# Patient Record
Sex: FEMALE | NOT HISPANIC OR LATINO | Employment: UNEMPLOYED | URBAN - NONMETROPOLITAN AREA
[De-identification: names, ages, dates, MRNs, and addresses within clinical notes are randomized per-mention and may not be internally consistent; named-entity substitution may affect disease eponyms.]

---

## 2018-11-02 ENCOUNTER — OFFICE VISIT CONVERTED (OUTPATIENT)
Dept: FAMILY MEDICINE CLINIC | Age: 78
End: 2018-11-02
Attending: FAMILY MEDICINE

## 2019-02-01 ENCOUNTER — OFFICE VISIT CONVERTED (OUTPATIENT)
Dept: FAMILY MEDICINE CLINIC | Age: 79
End: 2019-02-01
Attending: FAMILY MEDICINE

## 2019-02-01 ENCOUNTER — HOSPITAL ENCOUNTER (OUTPATIENT)
Dept: OTHER | Facility: HOSPITAL | Age: 79
Discharge: HOME | End: 2019-02-01
Attending: FAMILY MEDICINE

## 2019-02-01 LAB
ALBUMIN SERPL-MCNC: 3.4 G/DL (ref 3.5–5)
ALBUMIN/GLOB SERPL: 0.9 {RATIO} (ref 1.4–2.6)
ALP SERPL-CCNC: 83 U/L (ref 43–160)
ALT SERPL-CCNC: 10 U/L (ref 10–40)
ANION GAP SERPL CALC-SCNC: 19 MMOL/L (ref 8–19)
AST SERPL-CCNC: 13 U/L (ref 15–50)
BASOPHILS # BLD AUTO: 0.05 10*3/UL (ref 0–0.2)
BASOPHILS NFR BLD AUTO: 0.5 % (ref 0–3)
BILIRUB SERPL-MCNC: 0.48 MG/DL (ref 0.2–1.3)
BUN SERPL-MCNC: 30 MG/DL (ref 5–25)
BUN/CREAT SERPL: 20 {RATIO} (ref 6–20)
CALCIUM SERPL-MCNC: 9.7 MG/DL (ref 8.7–10.4)
CHLORIDE SERPL-SCNC: 100 MMOL/L (ref 99–111)
CONV CO2: 22 MMOL/L (ref 22–32)
CONV TOTAL PROTEIN: 7.3 G/DL (ref 6.3–8.2)
CREAT UR-MCNC: 1.53 MG/DL (ref 0.5–0.9)
EOSINOPHIL # BLD AUTO: 0.18 10*3/UL (ref 0–0.7)
EOSINOPHIL # BLD AUTO: 1.66 % (ref 0–7)
ERYTHROCYTE [DISTWIDTH] IN BLOOD BY AUTOMATED COUNT: 14.7 % (ref 11.5–14.5)
EST. AVERAGE GLUCOSE BLD GHB EST-MCNC: 151 MG/DL
GFR SERPLBLD BASED ON 1.73 SQ M-ARVRAT: 32 ML/MIN/{1.73_M2}
GLOBULIN UR ELPH-MCNC: 3.9 G/DL (ref 2–3.5)
GLUCOSE SERPL-MCNC: 163 MG/DL (ref 65–99)
HBA1C MFR BLD: 11.2 G/DL (ref 12–16)
HBA1C MFR BLD: 6.9 % (ref 3.5–5.7)
HCT VFR BLD AUTO: 35.1 % (ref 37–47)
LYMPHOCYTES # BLD AUTO: 2.61 10*3/UL (ref 1–5)
MCH RBC QN AUTO: 27.8 PG (ref 27–31)
MCHC RBC AUTO-ENTMCNC: 32 G/DL (ref 33–37)
MCV RBC AUTO: 87 FL (ref 81–99)
MONOCYTES # BLD AUTO: 0.95 10*3/UL (ref 0.2–1.2)
MONOCYTES NFR BLD AUTO: 8.91 % (ref 3–10)
NEUTROPHILS # BLD AUTO: 6.83 10*3/UL (ref 2–8)
NEUTROPHILS NFR BLD AUTO: 64.3 % (ref 30–85)
NRBC BLD AUTO-RTO: 0 % (ref 0–0.01)
OSMOLALITY SERPL CALC.SUM OF ELEC: 292 MOSM/KG (ref 273–304)
PLATELET # BLD AUTO: 377 10*3/UL (ref 130–400)
PMV BLD AUTO: 7.8 FL (ref 7.4–10.4)
POTASSIUM SERPL-SCNC: 4.7 MMOL/L (ref 3.5–5.3)
RBC # BLD AUTO: 4.03 10*6/UL (ref 4.2–5.4)
SODIUM SERPL-SCNC: 136 MMOL/L (ref 135–147)
VARIANT LYMPHS NFR BLD MANUAL: 24.6 % (ref 20–45)
WBC # BLD AUTO: 10.6 10*3/UL (ref 4.8–10.8)

## 2019-04-02 ENCOUNTER — HOSPITAL ENCOUNTER (OUTPATIENT)
Dept: OTHER | Facility: HOSPITAL | Age: 79
Discharge: HOME | End: 2019-04-02
Attending: FAMILY MEDICINE

## 2019-04-02 ENCOUNTER — OFFICE VISIT CONVERTED (OUTPATIENT)
Dept: FAMILY MEDICINE CLINIC | Age: 79
End: 2019-04-02
Attending: FAMILY MEDICINE

## 2019-04-02 LAB
APPEARANCE UR: CLEAR
BACTERIA UR CULT: NORMAL
BACTERIA UR QL AUTO: ABNORMAL
BILIRUB UR QL: NEGATIVE
CASTS URNS QL MICRO: ABNORMAL /[LPF]
COLOR UR: YELLOW
CONV LEUKOCYTE ESTERASE: NEGATIVE
CONV UROBILINOGEN IN URINE BY AUTOMATED TEST STRIP: 0.2 {EHRLICHU}/DL (ref 0.1–1)
EPI CELLS #/AREA URNS HPF: ABNORMAL /[HPF]
GLUCOSE 24H UR-MCNC: 100 MG/DL
HGB UR QL STRIP: ABNORMAL
KETONES UR QL STRIP: NEGATIVE MG/DL
MUCOUS THREADS URNS QL MICRO: ABNORMAL
NITRITE UR-MCNC: NEGATIVE MG/ML
PH UR STRIP.AUTO: 5 [PH] (ref 5–8)
PROT UR-MCNC: >=300 MG/DL
RBC # BLD AUTO: ABNORMAL /[HPF]
SP GR UR STRIP: >=1.03 (ref 1–1.03)
SPECIMEN SOURCE: ABNORMAL
UNIDENT CRYS URNS QL MICRO: ABNORMAL /[HPF]
WBC #/AREA URNS HPF: ABNORMAL /[HPF]

## 2019-04-04 LAB — BACTERIA UR CULT: NORMAL

## 2019-05-06 ENCOUNTER — OFFICE VISIT CONVERTED (OUTPATIENT)
Dept: FAMILY MEDICINE CLINIC | Age: 79
End: 2019-05-06
Attending: NURSE PRACTITIONER

## 2019-06-05 ENCOUNTER — HOSPITAL ENCOUNTER (OUTPATIENT)
Dept: OTHER | Facility: HOSPITAL | Age: 79
Discharge: HOME | End: 2019-06-05
Attending: FAMILY MEDICINE

## 2019-06-05 LAB
ALBUMIN SERPL-MCNC: 3.5 G/DL (ref 3.5–5)
ALBUMIN/GLOB SERPL: 1 {RATIO} (ref 1.4–2.6)
ALP SERPL-CCNC: 139 U/L (ref 43–160)
ALT SERPL-CCNC: 12 U/L (ref 10–40)
ANION GAP SERPL CALC-SCNC: 18 MMOL/L (ref 8–19)
AST SERPL-CCNC: 16 U/L (ref 15–50)
BASOPHILS # BLD AUTO: 0.09 10*3/UL (ref 0–0.2)
BASOPHILS NFR BLD AUTO: 0.7 % (ref 0–3)
BILIRUB SERPL-MCNC: 0.41 MG/DL (ref 0.2–1.3)
BUN SERPL-MCNC: 25 MG/DL (ref 5–25)
BUN/CREAT SERPL: 19 {RATIO} (ref 6–20)
CALCIUM SERPL-MCNC: 9.3 MG/DL (ref 8.7–10.4)
CHLORIDE SERPL-SCNC: 99 MMOL/L (ref 99–111)
CHOLEST SERPL-MCNC: 213 MG/DL (ref 107–200)
CHOLEST/HDLC SERPL: 5.8 {RATIO} (ref 3–6)
CONV ABS IMM GRAN: 0.08 10*3/UL (ref 0–0.2)
CONV CO2: 21 MMOL/L (ref 22–32)
CONV IMMATURE GRAN: 0.6 % (ref 0–1.8)
CONV TOTAL PROTEIN: 7 G/DL (ref 6.3–8.2)
CREAT UR-MCNC: 1.35 MG/DL (ref 0.5–0.9)
DEPRECATED RDW RBC AUTO: 52.4 FL (ref 36.4–46.3)
EOSINOPHIL # BLD AUTO: 0.4 10*3/UL (ref 0–0.7)
EOSINOPHIL # BLD AUTO: 3.1 % (ref 0–7)
ERYTHROCYTE [DISTWIDTH] IN BLOOD BY AUTOMATED COUNT: 15.5 % (ref 11.7–14.4)
EST. AVERAGE GLUCOSE BLD GHB EST-MCNC: 206 MG/DL
GFR SERPLBLD BASED ON 1.73 SQ M-ARVRAT: 37 ML/MIN/{1.73_M2}
GLOBULIN UR ELPH-MCNC: 3.5 G/DL (ref 2–3.5)
GLUCOSE SERPL-MCNC: 346 MG/DL (ref 65–99)
HBA1C MFR BLD: 11.7 G/DL (ref 12–16)
HBA1C MFR BLD: 8.8 % (ref 3.5–5.7)
HCT VFR BLD AUTO: 38.6 % (ref 37–47)
HDLC SERPL-MCNC: 37 MG/DL (ref 40–60)
LDLC SERPL CALC-MCNC: 133 MG/DL (ref 70–100)
LIPASE SERPL-CCNC: 110 U/L (ref 5–51)
LYMPHOCYTES # BLD AUTO: 3.3 10*3/UL (ref 1–5)
MCH RBC QN AUTO: 27.9 PG (ref 27–31)
MCHC RBC AUTO-ENTMCNC: 30.3 G/DL (ref 33–37)
MCV RBC AUTO: 91.9 FL (ref 81–99)
MONOCYTES # BLD AUTO: 0.84 10*3/UL (ref 0.2–1.2)
MONOCYTES NFR BLD AUTO: 6.6 % (ref 3–10)
NEUTROPHILS # BLD AUTO: 8.08 10*3/UL (ref 2–8)
NEUTROPHILS NFR BLD AUTO: 63.2 % (ref 30–85)
NRBC CBCN: 0 % (ref 0–0.7)
OSMOLALITY SERPL CALC.SUM OF ELEC: 294 MOSM/KG (ref 273–304)
PLATELET # BLD AUTO: 351 10*3/UL (ref 130–400)
PMV BLD AUTO: 10.3 FL (ref 9.4–12.3)
POTASSIUM SERPL-SCNC: 4.8 MMOL/L (ref 3.5–5.3)
RBC # BLD AUTO: 4.2 10*6/UL (ref 4.2–5.4)
SODIUM SERPL-SCNC: 133 MMOL/L (ref 135–147)
TRIGL SERPL-MCNC: 465 MG/DL (ref 40–150)
VARIANT LYMPHS NFR BLD MANUAL: 25.8 % (ref 20–45)
WBC # BLD AUTO: 12.79 10*3/UL (ref 4.8–10.8)

## 2019-07-24 ENCOUNTER — HOSPITAL ENCOUNTER (OUTPATIENT)
Dept: OTHER | Facility: HOSPITAL | Age: 79
Discharge: HOME | End: 2019-07-24
Attending: FAMILY MEDICINE

## 2019-08-01 ENCOUNTER — HOSPITAL ENCOUNTER (OUTPATIENT)
Dept: OTHER | Facility: HOSPITAL | Age: 79
Discharge: HOME | End: 2019-08-01
Attending: FAMILY MEDICINE

## 2019-09-20 ENCOUNTER — OFFICE VISIT CONVERTED (OUTPATIENT)
Dept: FAMILY MEDICINE CLINIC | Age: 79
End: 2019-09-20
Attending: FAMILY MEDICINE

## 2021-05-18 NOTE — PROGRESS NOTES
"Barbie Brown. 1940     Office/Outpatient Visit    Visit Date: Fri, Sep 20, 2019 03:01 pm    Provider: Mariama Gonzales MD (Assistant: Donna Jung MA)    Location: [facility]        Electronically signed by Mariama Gonzales MD on  09/20/2019 05:03:48 PM                             SUBJECTIVE:        CC:     Ms. Brown is a 79 year old White female.  Patient presents today for three month follow up (not taking Omeprazole);         HPI: Barbie is here to follow up on chronic issues.        She did see Dr. Hernandez over in [place] for an EGD.  He reportedly found a peptic ulcer \"that was beginning to heal.\"  However, \"she keeps irritating it,\" according to her daughter.  She had been taking omeprazole and sucralfate but that was stopped.  She was given a list of foods to avoid (but has been eating a lot of these anyway).          She is on Basaglar for diabetes -- she is taking 20 units QAM and 5 units QHS.  Humalog was stopped due to improper usage.  She has been monitoring sugar at home.  She was at 198 last night, which is an improvement from previously.  She is UTD on foot exam (11/2018).  She is UTD on eye exam (2/2019).  She is on an ACEI and ASA.        She is on lisinopril, HCTZ and metoprolol tartrate for HTN.  Her nephrologist has been managing her BP.         She had one injury fall in the at past year.         Carla is worried that she is having some memory issues.     ROS:     CONSTITUTIONAL:  Negative for fatigue and fever.      CARDIOVASCULAR:  Negative for chest pain, palpitations, pedal edema and tachycardia.      RESPIRATORY:  Negative for recent cough and dyspnea.      GASTROINTESTINAL:  Positive for abdominal pain ( epigastric; improved ).   Negative for constipation, diarrhea, nausea or vomiting.      MUSCULOSKELETAL:  Positive for arthralgias and (R shoulder) joint stiffness.   Negative for myalgias.      NEUROLOGICAL:  Positive for paresthesia ( bilateral lower " extremity ) and imbalance.   Negative for weakness.          PMH/FMH/SH:     Last Reviewed on 9/20/2019 03:13 PM by Mariama Gonzales    Past Medical History:                 PAST MEDICAL HISTORY         Coronary Artery Disease    Hypertension    Myocardial Infarction: s/p stenting x 3;     Type 2 Diabetes: uncontrolled;     Hypothyroidism         PREVENTIVE HEALTH MAINTENANCE             EYE EXAM: Diabetic Eye Exam during this calendar year and results are in chart was last done 2/5/2019     MAMMOGRAM: Done within last 2 years and results in are chart was last done 7/29/2019 with the following abnormalities noted-- 8/1/2019 diagnostic mammo shows benign cysts The next one is due  1 year         Surgical History:         Appendectomy: at age at very youg age;     Cholecystectomy: open procedure; at age 36; uncomplicated;     Coronary Artery Stent Placement: at age 56;     Hysterectomy: at age 36; total;      hemorrhoids surgery 2005; Procedures: colonoscopy 2005         Family History:     Father: Prostate Cancer ( death at age 59 )     Mother: Myocardial Infarction ( at age 64 )         Social History:     Occupation: Retired (Prior occupation: factory work)     Marital Status:  lives at [place]     Children: 2 children         Tobacco/Alcohol/Supplements:     Last Reviewed on 9/20/2019 03:13 PM by Mariama Gonzales    Tobacco: She has a past history of cigarette smoking; quit date:  1997.          Alcohol: Frequency:    rarely;         Substance Abuse History:     Last Reviewed on 9/20/2019 03:13 PM by Mariama Gonzales    None         Mental Health History:     Last Reviewed on 9/20/2019 03:13 PM by Mariama Gonzales        Communicable Diseases (eg STDs):     Last Reviewed on 9/20/2019 03:13 PM by Mariama Gonzales            Current Problems:     Last Reviewed on 6/05/2019 03:03 PM by Mariama Gonzales    Use of high risk medications     Chronic bronchitis, obstructive, with (acute) exacerbation     Depression,  recurrent     CAD     Morbid obesity     Microalbuminuria     Foot pain     Diabetes with neurological manifestations, type II or unspecified type, not stated as uncontrolled     Acquired hypothyroidism     Hypercholesterolemia     Hypertension         Immunizations:     Fluzone High-Dose pf (>=65 yr) 11/1/2018         Allergies:     Last Reviewed on 6/05/2019 03:03 PM by Mariama Gonzales    Prednisone:    Actos:    Lovastatin:        Current Medications:     Last Reviewed on 6/05/2019 03:03 PM by Mariama Gonzales    Clopidogrel 75mg Tablet 1 tab daily     Fluoxetine 10mg Tablet 1 tablet q hs     ProAir HFA 90mcg/1actuation Oral Inhaler Inhale 2 puff(s) by mouth q 4 to 6 hr prn.     Synthroid 0.075mg Tablet Take 1 tablet(s) by mouth daily     Lisinopril 40mg Tablet Take 1 tablet(s) by mouth bid     Nitroglycerin 0.4mg Tablets, Sublingual Dissolve 1 tablet(s) under the tongue may repeat every 5 minutes. Maximum of 3 doses in 15 minutes     Omeprazole 40mg Capsules, Extended Release 1 capsule daily     Gabapentin 300mg Capsules 1 capsule po BID     Quetiapine Fumarate 25mg Tablet 1 qhs     Hydrochlorothiazide (HCTZ) 25mg Tablet 1 TAB DAILY     Metoprolol 50mg Tablet 1 TAB BID     Aspirin (ASA) 81mg Chewable Tablet     b12 2500 mg     Basaglar KwikPen 100units/1ml Injection 20 units once a day     Bumetanide 1mg Tablet 1 tab twice daily as needed for fluid     Humalog KwikPen 100units/1ml Pen System, Disposable 30 units qhs         OBJECTIVE:        Vitals:         Current: 9/20/2019 3:08:02 PM    Ht:  4 ft, 11.5 in;  Wt: 181.6 lbs;  BMI: 36.1    T: 97.6 F (oral);  BP: 166/68 mm Hg (left arm, sitting);  P: 53 bpm (left arm (BP Cuff), sitting);  sCr: 1.35 mg/dL;  GFR: 33.76        Repeat:     3:41:08 PM     BP:   188/55mm Hg (left arm, sitting)         Exams:     PHYSICAL EXAM:     GENERAL: vital signs recorded - well developed, well nourished;  well groomed;  no apparent distress;     EYES: extraocular movements intact;  conjunctiva and cornea are normal; PERRLA;     E/N/T: OROPHARYNX:  normal mucosa, dentition, gingiva, and posterior pharynx;     RESPIRATORY: normal respiratory rate and pattern with no distress; normal breath sounds with no rales, rhonchi, wheezes or rubs;     CARDIOVASCULAR: normal rate; rhythm is regular;  no systolic murmur; no edema;     GASTROINTESTINAL: nontender; normal bowel sounds;     MUSCULOSKELETAL: normal gait; normal overall tone     NEUROLOGIC: mental status: alert and oriented x 3; Mini-Mental State Exam score is Mini-Mental State Exam score: 27/30         Lab/Test Results:             Hemoglobin A1c:  7.7 (09/20/2019),     Performed by::  tls (09/20/2019),             ASSESSMENT           789.06   R10.13  Epigastric abdominal pain              DDx:     250.60   E11.40  Diabetes with neurological manifestations, type II or unspecified type, not stated as uncontrolled              DDx:     401.1   I10  Hypertension              DDx:     V79.0   Z13.89  Screening for depression              DDx:         ORDERS:         Meds Prescribed:       Refill of: Gabapentin 300mg Capsules 1 capsule in am, 2 capsules hs  #90 (Ninety) capsule(s) Refills: 2       Refill of: Synthroid (Levothyroxine Sodium) 0.075mg Tablet Take 1 tablet(s) by mouth daily  #90 (Ninety) tablet(s) Refills: 1         Radiology/Test Orders:       3014F  Screening mammography results documented and reviewed (PV)1  (In-House)         2022F  Dilated retinal eye exam w/interpret by ophthalmologist/optometrist documented/reviewed (DM)4  (In-House)           Lab Orders:       [id number]*  Hgb A1c fast lab  (In-House)           Procedures Ordered:       [id number]  Collection of capillary blood specimen (eg, finger, heel, ear stick)  (In-House)           Other Orders:         Depression screen positive and follow up plan documented  (In-House)         1100F  Pt screen for fall risk; document 2+ falls in the past yr or any fall w/injury in past year  (LAUREN)  (In-House)                   PLAN:          Epigastric abdominal pain Improving.  Requesting records from Dr. Hernandez.  She reports not being put on a PPI despite finding an ulcer there.  RTC as scheduled in Nov.     MIPS PHQ-9 Depression Screening: Completed form scanned and in chart; Total Score 6 Positive Depression Screen: Stable on medications. No suicidal ideation.      FOLLOW-UP: Keep currently scheduled appointment.:.  Nov 2018           Orders:       3014F  Screening mammography results documented and reviewed (PV)1  (In-House)         2022F  Dilated retinal eye exam w/interpret by ophthalmologist/optometrist documented/reviewed (DM)4  (In-House)           Depression screen positive and follow up plan documented  (In-House)         1100F  Pt screen for fall risk; document 2+ falls in the past yr or any fall w/injury in past year (LAUREN)  (In-House)            Diabetes with neurological manifestations, type II or unspecified type, not stated as uncontrolled She is on Basaglar for diabetes -- she is taking 20 units QAM and 5 units QHS.  No refills needed.  Humalog was stopped due to improper usage.  She has been monitoring sugar at home.  She was at 198 last night, which is an improvement from previously.  Checking A1c today. She is UTD on foot exam (11/2018).  She is UTD on eye exam (2/2019).  She is on an ACEI and ASA.    Still having a lot of neuropathic foot pain worst at night with difficulty sleeping.  Increasing gabapentin to 600 mg QHS and continue 300 mg QAM.  She will let me know if she becomes overly sedated.     LABORATORY:  Labs ordered to be performed today include Hgb A1c inhouse fast lab.  Controlled substance documentation: [place] reviewed; drug screen performed and appropriate; consent is reviewed and signed and on the chart.  She is aware of risk of addiction on this medication, understands that she will need to follow up for a review every 3 months and her medications will be  adjusted or decreased as deemed appropriate at each visit.  No history of drug or alcohol abuse.  No concerns about diversion or abuse. She denies side effects related to the medication.  She is aware that she may be called in for pill counts.  The dosing of this medication will be reviewed on a regular basis and reduced if possible..  Ongoing use of a controlled substance is necessary for this patient to have a normal quality of life           Prescriptions:       Refill of: Gabapentin 300mg Capsules 1 capsule in am, 2 capsules hs  #90 (Ninety) capsule(s) Refills: 2           Orders:       [id number]*  Hgb A1c fast lab  (In-House)         18063  Collection of capillary blood specimen (eg, finger, heel, ear stick)  (In-House)            Hypertension Continue to monitor.  No changes to meds today.             Other Prescriptions:       Refill of: Synthroid (Levothyroxine Sodium) 0.075mg Tablet Take 1 tablet(s) by mouth daily  #90 (Ninety) tablet(s) Refills: 1         Patient Recommendations:        For  Epigastric abdominal pain:                     APPOINTMENT INFORMATION:        Monday Tuesday Wednesday Thursday Friday Saturday Sunday            Time:___________________AM  PM   Date:_____________________             CHARGE CAPTURE           **Please note: ICD descriptions below are intended for billing purposes only and may not represent clinical diagnoses**        Primary Diagnosis:         789.06 Epigastric abdominal pain            R10.13    Epigastric pain              Orders:          86462   Office/outpatient visit; established patient, level 4  (In-House)             3014F   Screening mammography results documented and reviewed (PV)1  (In-House)             2022F   Dilated retinal eye exam w/interpret by ophthalmologist/optometrist documented/reviewed (DM)4  (In-House)                Depression screen positive and follow up plan documented  (In-House)             1100F   Pt screen for fall risk;  document 2+ falls in the past yr or any fall w/injury in past year (LAUREN)  (In-House)           250.60 Diabetes with neurological manifestations, type II or unspecified type, not stated as uncontrolled            E11.40    Type 2 diabetes mellitus with diabetic neuropathy, unspecified              Orders:          15422*   Hgb A1c fast lab  (In-House)             93209   Collection of capillary blood specimen (eg, finger, heel, ear stick)  (In-House)           401.1 Hypertension            I10    Essential (primary) hypertension    V79.0 Screening for depression            Z13.89    Encounter for screening for other disorder

## 2021-05-18 NOTE — PROGRESS NOTES
Barbie Brown 1940     Office/Outpatient Visit    Visit Date: Tue, Apr 2, 2019 03:42 pm    Provider: Mariama Gonzales MD (Assistant: Yari Mcclendon LPN)    Location: [facility]        Electronically signed by Mariama Gonzales MD on  04/02/2019 05:03:27 PM                             SUBJECTIVE:        CC:     Ms. Brown is a 78 year old White female.  The patient is accompanied into the exam room by her grandaughter.  MEDICATION FOLLOW UP. RITO STATES SHE THINKS PT MAY HAVE A UTI.;         HPI: Barbie is here to follow up on diabetes and HTN.        She is now on Basaglar 20 units in the morning (unless she gets up at 11:00; then she only takes 10 units).  She has been adding in some sliding scale Humalog.  She has not been recording blood sugars.  She took 20 units last night after eating a fish sandwich, fries and milkshake that sent her BG to 500.  Sometimes she takes anywhere from 5-15 units at bedtime.  She has had at least 6 episodes of hypoglycemia (down to 40) in the past month.  She is due for foot exam.  She is UTD on eye exam (2/2019).        She is on lisinopril and metoprolol tartrate for HTN.  She has been going to see the nephrologist and he is going to take over on BP.  He has doubled her metoprolol and added HCTZ.          She is afraid she may have a UTI.  +Dysuria.  L flank pain.  +Frequency, no urgency.  No abd pain.  No F/C, no N/V.     ROS:     CONSTITUTIONAL:  Negative for fatigue and fever.      CARDIOVASCULAR:  Negative for chest pain, palpitations, pedal edema and tachycardia.      RESPIRATORY:  Negative for recent cough and dyspnea.      GASTROINTESTINAL:  Negative for abdominal pain, constipation, diarrhea, nausea and vomiting.      MUSCULOSKELETAL:  Positive for arthralgias, (R shoulder) joint stiffness and falls.   Negative for myalgias.      NEUROLOGICAL:  Positive for paresthesia ( bilateral lower extremity ) and imbalance.   Negative for weakness.       ENDOCRINE:  Positive for hypoglycemia.          PMH/FMH/SH:     Last Reviewed on 4/02/2019 04:12 PM by Mariama Gonzales    Past Medical History:                 PAST MEDICAL HISTORY         Coronary Artery Disease    Hypertension    Myocardial Infarction: s/p stenting x 3;     Type 2 Diabetes: uncontrolled;     Hypothyroidism         PREVENTIVE HEALTH MAINTENANCE             EYE EXAM: Diabetic Eye Exam during this calendar year and results are in chart was last done 2/5/2019         Surgical History:         Appendectomy: at age at very youg age;     Cholecystectomy: open procedure; at age 36; uncomplicated;     Coronary Artery Stent Placement: at age 56;     Hysterectomy: at age 36; total;      hemorrhoids surgery 2005; Procedures: colonoscopy 2005         Family History:     Father: Prostate Cancer ( death at age 59 )     Mother: Myocardial Infarction ( at age 64 )         Social History:     Occupation: Retired (Prior occupation: factory work)     Marital Status:  lives at [place]     Children: 2 children         Tobacco/Alcohol/Supplements:     Last Reviewed on 4/02/2019 04:12 PM by Mariama Gonzales    Tobacco: She has a past history of cigarette smoking; quit date:  1997.          Alcohol: Frequency:    rarely;         Substance Abuse History:     Last Reviewed on 4/02/2019 04:12 PM by Mariama Gonzales    None         Mental Health History:     Last Reviewed on 4/02/2019 04:12 PM by Mariama Gonzales        Communicable Diseases (eg STDs):     Last Reviewed on 4/02/2019 04:12 PM by Mariama Gonzales            Current Problems:     Last Reviewed on 2/01/2019 04:27 PM by Mariama Gonzales    Use of high risk medications     Depression, recurrent     CAD     Morbid obesity     Microalbuminuria     Foot pain     Diabetes with neurological manifestations, type II or unspecified type, not stated as uncontrolled     Acquired hypothyroidism     Hypercholesterolemia     Hypertension     Shoulder pain          Immunizations:     Fluzone High-Dose pf (>=65 yr) 11/1/2018         Allergies:     Last Reviewed on 2/01/2019 04:27 PM by Mariama Gonzales    Actos:    Lovastatin:        Current Medications:     Last Reviewed on 2/01/2019 04:27 PM by Mariama Gonzales    Synthroid 0.075mg Tablet Take 1 tablet(s) by mouth daily     Nitroglycerin 0.4mg Tablets, Sublingual Dissolve 1 tablet(s) under the tongue may repeat every 5 minutes. Maximum of 3 doses in 15 minutes     Isosorbide Dinitrate 30mg Tablet one a day     Lisinopril 40mg Tablet 1 tab daily     Trazodone HCl 50mg Tablet 1 tab hs     Aspirin (ASA) 81mg Chewable Tablet     b12 2500 mg     Basaglar KwikPen 100units/1ml Injection 20 units once a day     Bumetanide 1mg Tablet 1 tab twice daily as needed for fluid     Gabapentin 300mg Capsules 1 capsule po BID     Humalog KwikPen 100units/1ml Pen System, Disposable 30 units qhs         OBJECTIVE:        Vitals:         Current: 4/2/2019 3:56:08 PM    Ht:  4 ft, 11.5 in;  Wt: 193 lbs;  BMI: 38.3    T: 97.3 F (oral);  BP: 170/41 mm Hg (left arm, sitting);  P: 49 bpm (left arm (BP Cuff), sitting);  sCr: 1.53 mg/dL;  GFR: 31.07        Repeat:     3:58:34 PM     BP:   159/43mm Hg (right arm)     4:42:34 PM     BP:   160/68mm Hg (left arm, sitting)     3:58:50 PM     P:   48bpm (right arm (BP Cuff), sitting)         Exams:     PHYSICAL EXAM:     GENERAL: vital signs recorded - well developed, well nourished;  well groomed;  no apparent distress;     EYES: extraocular movements intact; conjunctiva and cornea are normal; PERRLA;     E/N/T: OROPHARYNX:  normal mucosa, dentition, gingiva, and posterior pharynx;     RESPIRATORY: normal respiratory rate and pattern with no distress; normal breath sounds with no rales, rhonchi, wheezes or rubs;     CARDIOVASCULAR: normal rate; rhythm is regular;  no systolic murmur; no edema;     GASTROINTESTINAL: nontender; normal bowel sounds;     MUSCULOSKELETAL: normal gait; normal overall tone R bicep  "TTP;     PSYCHIATRIC: appropriate affect and demeanor; normal psychomotor function; normal speech pattern;         ASSESSMENT           250.60   E11.40  Diabetes with neurological manifestations, type II or unspecified type, not stated as uncontrolled              DDx:     401.1   I10  Hypertension              DDx:     788.1   R30.0  Dysuria              DDx:     V76.12   Z12.31  Screening mammogram - other              DDx:     307.41   F51.01  Insomnia              DDx:         ORDERS:         Meds Prescribed:       Quetiapine Fumarate 25mg Tablet 1 qhs  #30 (Thirty) tablet(s) Refills: 1         Radiology/Test Orders:       [id number]  Screening mammography bi 2-view inc CAD  (Send-Out)         2022F  Dilated retinal eye exam w/interpret by ophthalmologist/optometrist documented/reviewed (DM)4  (In-House)           Lab Orders:       [id number]  BDUAM - H Urinalysis, automated, with micro  (Send-Out; Stat)         APPTO  Appointment need  (In-House)                   PLAN:          Diabetes with neurological manifestations, type II or unspecified type, not stated as uncontrolled She is continuing to take Humalog at bedtime (she states to help her sleep -- \"I can't sleep unless my blood sugar is under 100\") but has had multiple episodes of hypoglycemia down to 40 over the past month.  She needs to stop Humalog altogether.  We will increase Basaglar to 20 units QAM and 5 units QHS to help cover her over bedtime, hopefully without precipitating these big drops.  Keep a lot of sugars and bring these to next visit.  RTC 2 months.     MIPS    DIABETIC EYE EXAM: Diabetic Eye Exam during this calendar year and results are in chart     FOLLOW-UP: Schedule a follow-up visit in 2 months..  f/u DM with [place]           Orders:       APPTO  Appointment need  (In-House)         2022F  Dilated retinal eye exam w/interpret by ophthalmologist/optometrist documented/reviewed (DM)4  (In-House)            Hypertension Nephro is managing     "      Dysuria Concern for UTI vs kidney stone, call with results     LABORATORY:  Labs ordered to be performed today include urinalysis with micro.            Orders:       [id number]  [facility] - [facility] Urinalysis, automated, with micro  (Send-Out; Stat)            Screening mammogram - other         RADIOLOGY:  I have ordered Mammogram Screening to be done today.            Orders:       [id number]  Screening mammography bi 2-view inc CAD  (Send-Out)            Insomnia Trazodone is not helping, granddaughter thinks it might be making her more confused.  OK to d/c.  Will try Seroquel instead.  Melatonin is also an option.  Barbie has been on amitriptyline for years in the past but this was discontinued d/t balance issues.  Discussed that Ambien, etc are not good options.           Prescriptions:       Quetiapine Fumarate 25mg Tablet 1 qhs  #30 (Thirty) tablet(s) Refills: 1             Patient Recommendations:        For  Diabetes with neurological manifestations, type II or unspecified type, not stated as uncontrolled:     Schedule a follow-up visit in 2 months.                APPOINTMENT INFORMATION:        Monday Tuesday Wednesday Thursday Friday Saturday Sunday            Time:___________________AM  PM   Date:_____________________             CHARGE CAPTURE           **Please note: ICD descriptions below are intended for billing purposes only and may not represent clinical diagnoses**        Primary Diagnosis:         250.60 Diabetes with neurological manifestations, type II or unspecified type, not stated as uncontrolled            E11.40    Type 2 diabetes mellitus with diabetic neuropathy, unspecified              Orders:          [id number]   Office/outpatient visit; established patient, level 4  (In-House)             APPTO   Appointment need  (In-House)             2022F   Dilated retinal eye exam w/interpret by ophthalmologist/optometrist documented/reviewed (DM)4  (In-House)           401.1 Hypertension            I10     Essential (primary) hypertension    788.1 Dysuria            R30.0    Dysuria    V76.12 Screening mammogram - other            Z12.31    Encounter for screening mammogram for malignant neoplasm of breast    307.41 Insomnia            F51.01    Primary insomnia

## 2021-05-18 NOTE — PROGRESS NOTES
"Barbie Brown 1940     Office/Outpatient Visit    Visit Date: Fri, Nov 2, 2018 01:32 pm    Provider: Mariama Gonzales MD (Assistant: Phillip Díaz)    Location: [facility]        Electronically signed by Mariama Gonzales MD on  11/02/2018 02:59:10 PM                             SUBJECTIVE:        CC:     Ms. Brown is a 78 year old White female.  This is her first visit to the clinic.          HPI: Barbie is here today to establish care with me from Dr. Olivares and Valarie.  Her niece Sayra accompanies her today.          She has diabetes and is on Basaglar 20 units daily as well as Humalog 30 units QHS.  BG has been running too low.  She has had some spells where she \"blacked out.\"  She states it usually runs around 75 fasting but has been lower during the syncopal spells.  However, it gets up to 200 later in the day.  She is due for foot exam.  She is due for eye exam.        She is on gabapentin for diabetic peripheral neuropathy.  She has had falling spells lately.  She is worried she may be on too much medication.  The falls started in July.  She has fallen almost every day for the past 2 weeks.  She did fall and hit her R shoulder.  XR done at urgent care was negative.  She is still having some pain in that shoulder.   She has been using Aspercreme.  She has to lift the R arm up with the L arm to raise the shoulder.  She did have the little toe on the R foot amputated back in July, before the falls started.  D/t osteomyelitis.        She is on metoprolol tartrate and amlodipine for HTN.  BP has been well controlled.  The pharmacist told her that he thought her BP medicine was too strong.  No CP, SOB, palpitations.  She has CAD for which she is on isosorbide dinitrate and NTG prn.  She is on DAPT with ASA/Plavix as well as bumetanide.          She is on Synthroid for hypothyroidism.  No heat/cold intolerance, no changes in hair or skin.        She is on amitriptyline and fluoxetine " "for anxiety and insomnia.  She has been on these for years.  She doesn't sleep well at night.  She then sleeps during the day.  \"I walk all night.\"          Patient to be evaluated for screening for depression.          PHQ-9 Depression Screening: Completed form scanned and in chart; Total Score 7 Alcohol Consumption Screening: Completed form scanned and in chart; Total Score 1     ROS:     CONSTITUTIONAL:  Negative for fatigue and fever.      EYES:  Positive for blurred vision.      E/N/T:  Negative for diminished hearing and nasal congestion.      CARDIOVASCULAR:  Negative for chest pain, palpitations, pedal edema and tachycardia.      RESPIRATORY:  Negative for recent cough and dyspnea.      GASTROINTESTINAL:  Negative for abdominal pain, constipation, diarrhea, nausea and vomiting.      GENITOURINARY:  Negative for dysuria and urinary incontinence.      MUSCULOSKELETAL:  Positive for arthralgias, (R shoulder) joint stiffness and falls.   Negative for myalgias.      INTEGUMENTARY/BREAST:  Negative for atypical mole(s) and rash.      NEUROLOGICAL:  Positive for paresthesia ( bilateral lower extremity ) and imbalance.   Negative for weakness.      PSYCHIATRIC:  Negative for anxiety, depression and sleep disturbance.          PMH/FMH/SH:     Last Reviewed on 11/02/2018 02:45 PM by Mariama Gonzales    Past Medical History:                 PAST MEDICAL HISTORY         Coronary Artery Disease    Hypertension    Myocardial Infarction: s/p stenting x 3;     Type 2 Diabetes: uncontrolled;     Hypothyroidism         Surgical History:         Appendectomy: at age at very youg age;     Cholecystectomy: open procedure; at age 36; uncomplicated;     Coronary Artery Stent Placement: at age 56;     Hysterectomy: at age 36; total;      hemorrhoids surgery 2005; Procedures: colonoscopy 2005         Family History:     Father: Prostate Cancer ( death at age 59 )     Mother: Myocardial Infarction ( at age 64 )         Social History: "     Occupation: Retired (Prior occupation: factory work)     Marital Status:  lives at [place]     Children: 2 children         Tobacco/Alcohol/Supplements:     Last Reviewed on 11/02/2018 02:45 PM by Mariama Gonzales    Tobacco: She has a past history of cigarette smoking; quit date:  1997.          Alcohol: Frequency:    rarely;         Substance Abuse History:     Last Reviewed on 11/02/2018 02:45 PM by Mariama Gonzales    None         Mental Health History:     Last Reviewed on 11/02/2018 02:45 PM by Mariama Gonzales        Communicable Diseases (eg STDs):     Last Reviewed on 11/02/2018 02:45 PM by Mariama Gonzales            Current Problems:     Morbid obesity     Candidal intertrigo     Microalbuminuria     Foot pain     Diabetes with neurological manifestations, type II or unspecified type, not stated as uncontrolled     Acquired hypothyroidism     Hypercholesterolemia     Hypertension     Type II diabetes, uncontrolled         Immunizations:     None        Allergies:     Last Reviewed on 2/02/2012 10:08 AM by Maylin Li    Prednisone:    Actos:    Lovastatin:        Current Medications:     Last Reviewed on 2/02/2012 10:15 AM by Morgan Smith    Lotensin HCT 20mg/25mg Tablet Take 1 tablet(s) by mouth BID     Synthroid 0.05mg Tablet Take 1 tablet(s) by mouth daily     Lipitor 20mg Tablet Take 1 tablet(s) by mouth daily     Synthroid 0.075mg Tablet Take 1 tablet(s) by mouth daily     Atenolol 50mg Tablet Take 1 tablet(s) by mouth daily     Gabapentin 100mg Capsules Take 2 capsule(s) by mouth q HS     Glucophage 500mg Tablet Take 2 tablet(s) by mouth bid         OBJECTIVE:        Vitals:         Current: 11/2/2018 1:51:40 PM    Ht:  4 ft, 11.5 in;  Wt: 206 lbs;  BMI: 40.9    T: 97.8 F (oral);  BP: 138/68 mm Hg (left arm, sitting);  sCr: 1.1 mg/dL;  GFR: 44.43        Exams:     PHYSICAL EXAM:     GENERAL: vital signs recorded - well developed, well nourished;  well groomed;  no apparent  distress;     EYES: extraocular movements intact; conjunctiva and cornea are normal; PERRLA;     E/N/T: OROPHARYNX:  normal mucosa, dentition, gingiva, and posterior pharynx;     NECK: range of motion is normal; thyroid exam reveals not enlarged;     RESPIRATORY: normal respiratory rate and pattern with no distress; normal breath sounds with no rales, rhonchi, wheezes or rubs;     CARDIOVASCULAR: normal rate; rhythm is regular;  no systolic murmur; no edema;     GASTROINTESTINAL: nontender; normal bowel sounds;     LYMPHATIC: no enlargement of cervical or facial nodes;     MUSCULOSKELETAL: normal gait; normal overall tone     NEUROLOGIC: mental status: alert and oriented x 3; Reflexes: brachioradialis: 2+; knee jerks: 2+;     PSYCHIATRIC: appropriate affect and demeanor; normal psychomotor function; normal speech pattern;     Left foot exam    Protective sensation using Monofilament test: Slightly decreased, with estimated force of 0.2 grams applied.    Vascular status: normal peripheral vascular exam with palpable dorsal pedal and posterior tibal pulses and brisk digital capillary refill    Skin is intact without sores or ulcers    Right foot exam    Protective sensation using Monofilament test: Slightly decreased, with estimated force of 0.2 grams applied.    Vascular status: normal peripheral vascular exam with palpable dorsal pedal and posterior tibal pulses and brisk digital capillary refill    Skin is intact without sores or ulcers         ASSESSMENT           250.60   E11.40  Diabetes with neurological manifestations, type II or unspecified type, not stated as uncontrolled              DDx:     V58.69   Z79.899  Use of high risk medications              DDx:     307.41   F51.01  Insomnia              DDx:     781.2   R26.81  Imbalance              DDx:     719.41   S46.011S  Shoulder pain              DDx:     401.1   I10  Hypertension              DDx:     272.0   E78.2  Hypercholesterolemia               DDx:     414.01   I25.118  CAD              DDx:     244.8   E03.9  Acquired hypothyroidism              DDx:     V79.0   Z13.89  Screening for depression              DDx:         ORDERS:         Meds Prescribed:       Trazodone HCl 50mg Tablet 1 tab hs  #30 (Thirty) tablet(s) Refills: 5         Radiology/Test Orders:       [id number]  Right Radiologic exam, shoulder; comp, 2 views  (Send-Out)           Lab Orders:       [id number]  DIAB2 - [facility] CMP A1C LIPID AND MICRO ALBUM CR RATIO: [id number]  (Send-Out)         APPTO  Appointment need  (In-House)         37951  TSH - [facility] TSH  (Send-Out)           Procedures Ordered:       REFER  Referral to Specialist or Other Facility  (Send-Out)           Other Orders:         Negative EtOH screen  (In-House)           Calculated BMI above the upper parameter and a follow-up plan was documented in the medical record  (In-House)           Depression screen positive and follow up plan documented  (In-House)           Negative EtOH screen  (In-House)         2028F  Foot examination performed (includes examination through visual inspection, sensory exam with monofi  (In-House)                   PLAN:          Diabetes with neurological manifestations, type II or unspecified type, not stated as uncontrolled She has diabetes and is on Basaglar 20 units daily as well as Humalog 30 units QHS.  BG has been actually been running too low and she actually had some syncopal spells.  Cutting both basaglar and Humalog in half to 10 units and 15 units respectively.  She takes them both at night.  Blood sugar log given and she will work on recording numbers for me.  Low sugars may be contributing to falls.  Foot exam today shows slightly decreased sensation.  She is due for eye exam; will work on getting this set up next visit.  RTC 2 months.     LABORATORY:  Labs ordered to be performed today include Diabetes Panel 2;CMP, A1C, Lipid, Microalbumin:Creatinine  Ratio.  MIPS     BMI Elevated - Follow-Up Plan: She was provided education on weight loss strategies     FOLLOW-UP: Schedule a follow-up visit in 2 months..  f/u DM with Christian           Orders:       [id number]  DIAB2 - [facility] CMP A1C LIPID AND MICRO ALBUM CR RATIO: [id number]  (Send-Out)         APPTO  Appointment need  (In-House)           Calculated BMI above the upper parameter and a follow-up plan was documented in the medical record  (In-House)             Patient Education Handouts:       [facility] Medication Compliance           Use of high risk medications Does not need refills on gabapentin today.  Will plan to get tox screen and contract at next visit.           Insomnia D/cing amitriptyline (as this can also contribute to falls) and fluoxetine both since they are not helping.  Will try some trazodone instead for insomnia, which is her biggest issue.           Prescriptions:       Trazodone HCl 50mg Tablet 1 tab hs  #30 (Thirty) tablet(s) Refills: 5          Imbalance Aside from above issues, falls seem to have started since she had her R 5th toe amputated in July.  Possibly causing balance issues in combination with the diabetic neuropathy.  Referral placed to PT for balance training.         REFERRALS:  Referral initiated to physical therapy ( [facility]; for evaluation of imbalance, peripheral neuropathy, falls ).            Orders:       REFER  Referral to Specialist or Other Facility  (Send-Out)            Shoulder pain R shoulder pain after trauma in recent falls.  Checking XR today.         RADIOLOGY:  I have ordered Shoulder x-ray: right shoulder x-ray to be done today.            Orders:       [id number]  Right Radiologic exam, shoulder; comp, 2 views  (Send-Out)            Hypertension BP looks okay.  May consider d/cing amlodipine and loosening control given her age but no changes today.  Continue to monitor.  Checking labs.          Hypercholesterolemia  S/p MI.          CAD S/p MI.  No longer following with Cards.  Continue to monitor.          Acquired hypothyroidism No refills needed.  Checking labs.     LABORATORY:  Labs ordered to be performed today include TSH.            Orders:       [id number]  TSH - [facility] TSH  (Send-Out)            Screening for depression     MIPS PHQ-9 Depression Screening: Completed form scanned and in chart; Total Score 7 Negative alcohol screen           Orders:         Depression screen positive and follow up plan documented  (In-House)           Negative EtOH screen  (In-House)               Other Orders:         Negative EtOH screen  (In-House)         2028F  Foot examination performed (includes examination through visual inspection, sensory exam with monofi  (In-House)           Patient Recommendations:        For  Diabetes with neurological manifestations, type II or unspecified type, not stated as uncontrolled:     Schedule a follow-up visit in 2 months.                APPOINTMENT INFORMATION:        Monday Tuesday Wednesday Thursday Friday Saturday Sunday            Time:___________________AM  PM   Date:_____________________             CHARGE CAPTURE           **Please note: ICD descriptions below are intended for billing purposes only and may not represent clinical diagnoses**        Primary Diagnosis:         250.60 Diabetes with neurological manifestations, type II or unspecified type, not stated as uncontrolled            E11.40    Type 2 diabetes mellitus with diabetic neuropathy, unspecified              Orders:          10584   Office/outpatient visit; new patient, level 4  (In-House)             APPTO   Appointment need  (In-House)                Calculated BMI above the upper parameter and a follow-up plan was documented in the medical record  (In-House)           V58.69 Use of high risk medications            Z79.899    Other long term (current) drug therapy    307.41 Insomnia            F51.01     Primary insomnia    781.2 Imbalance            R26.81    Unsteadiness on feet    719.41 Shoulder pain            S46.011S    Strain of muscle(s) and tendon(s) of the rotator cuff of right shoulder, sequela    401.1 Hypertension            I10    Essential (primary) hypertension    272.0 Hypercholesterolemia            E78.2    Mixed hyperlipidemia    414.01 CAD            I25.118    Atherosclerotic heart disease of native coronary artery with other forms of angina pectoris    244.8 Acquired hypothyroidism            E03.9    Hypothyroidism, unspecified    V79.0 Screening for depression            Z13.89    Encounter for screening for other disorder              Orders:             Depression screen positive and follow up plan documented  (In-House)                Negative EtOH screen  (In-House)               Other Orders:              Negative EtOH screen  (In-House)             2028F   Foot examination performed (includes examination through visual inspection, sensory exam with monofi  (In-House)           ADDENDUMS:      ____________________________________    Addendum: 12/10/2018 04:14 PM - Susannah Sung         Visit Note Faxed to:        User Entered Recipient; Number [phone]            Addendum: 01/07/2019 04:24 PM - Susannah Sung         Visit Note Faxed to:        User Entered Recipient; Number [phone]

## 2021-05-18 NOTE — PROGRESS NOTES
Barbie Brown 1940     Office/Outpatient Visit    Visit Date: Fri, Feb 1, 2019 03:59 pm    Provider: Mariama Gonzales MD (Assistant: Hilaria Ramirez LPN)    Location: [facility]        Electronically signed by Mariama Gonzales MD on  02/01/2019 05:34:11 PM                             SUBJECTIVE:        CC:     Ms. Brown is a 78 year old White female.  Follow up visit;         HPI: Barbie is here to follow up on chronic issues.    She is on Basaglar 20 units in the morning (9:00-10:00) and Humalog 30 units QHS for diabetes.  She has changed her Basaglar to the morning, and that has helped.  She was instructed to cut both these in half at her last appt due to episodes of hypoglycemia but she has been taking the full doses of both.  She is due for foot exam.  She is due for eye exam; going for that next week.  She is on gabapentin for diabetic peripheral neuropathy.  No adverse effects.          At her last visit, we discontinued fluoxetine and amitriptyline due to concern for falls and ineffectiveness, respectively.  Instead, we started her on trazodone for sleep.  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         She is on lisinopril and metoprolol tartrate for HTN.  This was switched after she was found to have diabetic nephropathy (lisinopril for amlodipine).  Since the switch, BP has routinely been running in the 200s.  No CP, SOB, palpitations.  She has CAD for which she is on isosorbide dinitrate and NTG prn.  She is on DAPT with ASA/Plavix as well as bumetanide.          She is still having R shoulder pain.  Has not been going to PT because of the distance to drive.     ROS:     CONSTITUTIONAL:  Negative for fatigue and fever.      CARDIOVASCULAR:  Negative for chest pain, palpitations, pedal edema and tachycardia.      RESPIRATORY:  Negative for recent cough and dyspnea.      GASTROINTESTINAL:  Negative for abdominal pain, constipation,  diarrhea, nausea and vomiting.      MUSCULOSKELETAL:  Positive for arthralgias, (R shoulder) joint stiffness and falls.   Negative for myalgias.      NEUROLOGICAL:  Positive for paresthesia ( bilateral lower extremity ) and imbalance.   Negative for weakness.      ENDOCRINE:  Positive for hypoglycemia.      PSYCHIATRIC:  Negative for anxiety, depression and sleep disturbance.          PMH/FMH/SH:     Last Reviewed on 2/01/2019 04:27 PM by Mariama Gonzales    Past Medical History:                 PAST MEDICAL HISTORY         Coronary Artery Disease    Hypertension    Myocardial Infarction: s/p stenting x 3;     Type 2 Diabetes: uncontrolled;     Hypothyroidism         Surgical History:         Appendectomy: at age at very youg age;     Cholecystectomy: open procedure; at age 36; uncomplicated;     Coronary Artery Stent Placement: at age 56;     Hysterectomy: at age 36; total;      hemorrhoids surgery 2005; Procedures: colonoscopy 2005         Family History:     Father: Prostate Cancer ( death at age 59 )     Mother: Myocardial Infarction ( at age 64 )         Social History:     Occupation: Retired (Prior occupation: factory work)     Marital Status:  lives at [place]     Children: 2 children         Tobacco/Alcohol/Supplements:     Last Reviewed on 2/01/2019 04:27 PM by Mariama Gonzales    Tobacco: She has a past history of cigarette smoking; quit date:  1997.          Alcohol: Frequency:    rarely;         Substance Abuse History:     Last Reviewed on 2/01/2019 04:27 PM by Mariama Gonzales    None         Mental Health History:     Last Reviewed on 2/01/2019 04:27 PM by Mariama Gonzales        Communicable Diseases (eg STDs):     Last Reviewed on 2/01/2019 04:27 PM by Mariama Gonzales            Current Problems:     Last Reviewed on 11/02/2018 02:45 PM by Mariama Gonzales    Use of high risk medications     Depression, recurrent     CAD     Morbid obesity     Microalbuminuria     Foot pain     Diabetes with  neurological manifestations, type II or unspecified type, not stated as uncontrolled     Acquired hypothyroidism     Hypercholesterolemia     Hypertension     Type II diabetes, uncontrolled         Immunizations:     Fluzone High-Dose pf (>=65 yr) 11/1/2018         Allergies:     Last Reviewed on 2/01/2019 04:02 PM by Hilaria Ramriez    Actos:    Lovastatin:        Current Medications:     Last Reviewed on 2/01/2019 04:03 PM by Hilaria Ramirez    Lisinopril 10mg Tablet one a day     Synthroid 0.075mg Tablet Take 1 tablet(s) by mouth daily     Nitroglycerin 0.4mg Tablets, Sublingual Dissolve 1 tablet(s) under the tongue may repeat every 5 minutes. Maximum of 3 doses in 15 minutes     Clopidogrel 75mg Tablet 1 tab daily     Trazodone HCl 50mg Tablet 1 tab hs     Aspirin (ASA) 81mg Chewable Tablet     b12 2500 mg     Basaglar KwikPen 100units/1ml Injection 20 units once a day     Bumetanide 1mg Tablet 1 tab twice daily as needed for fluid     Gabapentin 300mg Capsules 1 capsule po BID     Humalog KwikPen 100units/1ml Pen System, Disposable 30 units qhs     Isosorbide Dinitrate 30mg Tablet one a day         OBJECTIVE:        Vitals:         Current: 2/1/2019 4:05:55 PM    Ht:  4 ft, 11.5 in;  Wt: 191.8 lbs;  BMI: 38.1    T: 97.2 F (oral);  BP: 188/62 mm Hg (left arm, sitting);  P: 56 bpm (left arm (BP Cuff), sitting);  sCr: 1.97 mg/dL;  GFR: 24.07        Repeat:     4:59:31 PM     BP:   197/49mm Hg (left arm, sitting)         Exams:     PHYSICAL EXAM:     GENERAL: vital signs recorded - well developed, well nourished;  well groomed;  no apparent distress;     EYES: extraocular movements intact; conjunctiva and cornea are normal; PERRLA;     E/N/T: OROPHARYNX:  normal mucosa, dentition, gingiva, and posterior pharynx;     RESPIRATORY: normal respiratory rate and pattern with no distress; normal breath sounds with no rales, rhonchi, wheezes or rubs;     CARDIOVASCULAR: normal rate; rhythm is regular;  no systolic  murmur; no edema;     GASTROINTESTINAL: nontender; normal bowel sounds;     MUSCULOSKELETAL: normal gait; normal overall tone     PSYCHIATRIC: appropriate affect and demeanor; normal psychomotor function; normal speech pattern;         ASSESSMENT           250.60   E11.40  Diabetes with neurological manifestations, type II or unspecified type, not stated as uncontrolled              DDx:     296.30   F33.8  Depression, recurrent              DDx:     401.1   I10  Hypertension              DDx:     719.41   S46.011S  Shoulder pain              DDx:         ORDERS:         Meds Prescribed:       Refill of: Lisinopril 40mg Tablet 1 tab daily  #30 (Thirty) tablet(s) Refills: 2         Lab Orders:       APPTO  Appointment need  (In-House)         31888  BDCBC - [facility] CBC with 3 part diff  (Send-Out)         81655  COMP [facility] Comp. Metabolic Panel  (Send-Out)         00344  A1CEG - [facility] Hemoglobin A1C  (Send-Out)           Procedures Ordered:       REFER  Referral to Specialist or Other Facility  (Send-Out)           Other Orders:         Calculated BMI above the upper parameter and a follow-up plan was documented in the medical record  (In-House)                   PLAN:          Diabetes with neurological manifestations, type II or unspecified type, not stated as uncontrolled She is on Basaglar 20 units in the morning (9:00-10:00) and Humalog 30 units QHS for diabetes.  She has changed her Basaglar to the morning, and that has helped.  She was instructed to cut both these in half at her last appt due to episodes of hypoglycemia but she has been taking the full doses of both.  I am worried that she is continuing to take the Humalog with these sugars getting so low.  I am going to ask her to discontinue her Humalog altogether except for a sliding scale dose.  She is due for foot exam.  She is due for eye exam; going for that next week.  She is on gabapentin for diabetic peripheral neuropathy.  No adverse effects.       LABORATORY:  Labs ordered to be performed today include CBC, Comprehensive metabolic panel, and HgbA1C.  MIPS     BMI Elevated - Follow-Up Plan: She was provided education on weight loss strategies     FOLLOW-UP: Schedule a follow-up visit in 2 months..  f/u DM with [facility]           Orders:       APPTO  Appointment need  (In-House)         [facility]  ([facility]         70020  COMP - [facility] Comp. Metabolic Panel  (Send-Out)         22977  A1CEG - [facility] Hemoglobin A1C  (Send-Out)           Calculated BMI above the upper parameter and a follow-up plan was documented in the medical record  (In-House)             Patient Education Handouts:       [facility] Medication Compliance           Depression, recurrent Doing fine off amitriptyline.  Trazodone is helping with sleep.  Continue fluoxetine for now.          Hypertension Systolic BP has been getting up to the 200s at home since switching from amlodipine to lisinopril.  Increased lisinopril from 10 mg to 40 mg.  Continue to monitor BP at home.           Prescriptions:       Refill of: Lisinopril 40mg Tablet 1 tab daily  #30 (Thirty) tablet(s) Refills: 2          Shoulder pain Couldn't get transportation up here for PT.  Will get her in closer to home instead.         REFERRALS:  Referral initiated to physical therapy ( at [place] or [place]; for R shoulder pain ).            Orders:       REFER  Referral to Specialist or Other Facility  (Send-Out)               Patient Recommendations:        For  Diabetes with neurological manifestations, type II or unspecified type, not stated as uncontrolled:     Schedule a follow-up visit in 2 months.                APPOINTMENT INFORMATION:        Monday Tuesday Wednesday Thursday Friday Saturday Sunday            Time:___________________AM  PM   Date:_____________________             CHARGE CAPTURE           **Please note: ICD descriptions below are intended for billing purposes only and may  not represent clinical diagnoses**        Primary Diagnosis:         250.60 Diabetes with neurological manifestations, type II or unspecified type, not stated as uncontrolled            E11.40    Type 2 diabetes mellitus with diabetic neuropathy, unspecified              Orders:          28291   Office/outpatient visit; established patient, level 4  (In-House)             APPTO   Appointment need  (In-House)                Calculated BMI above the upper parameter and a follow-up plan was documented in the medical record  (In-House)           296.30 Depression, recurrent            F33.8    Other recurrent depressive disorders    401.1 Hypertension            I10    Essential (primary) hypertension    719.41 Shoulder pain            S46.011S    Strain of muscle(s) and tendon(s) of the rotator cuff of right shoulder, sequela

## 2021-05-18 NOTE — PROGRESS NOTES
Barbie Brown 1940     Office/Outpatient Visit    Visit Date: Mon, May 6, 2019 12:55 pm    Provider: Maryuri Burns N.P. (Assistant: Kailyn Garnica MA)    Location: [facility]        Electronically signed by Maryuri Burns N.P. on  05/06/2019 01:21:07 PM                             SUBJECTIVE:        CC:     Ms. Brown is a 78 year old White female.  She presents with SOB onset x 1 wk.          HPI:         Patient complains of shortness of breath.  This has been noted for the past week.  Its course has been stable.  Associated symptoms include subjective fever and wheezing.  She denies cough or orthopnea.  Medical history is pertinent for COPD and CHF.  Notes similar problems in the past. Has used inhalers with improvement but she is out of prescription. Reports blood sugars have been running well. Family member with her today.     ROS:     CONSTITUTIONAL:  Negative for chills and fever.      EYES:  Negative for eye drainage and eye pain.      E/N/T:  Negative for ear pain and sore throat.      CARDIOVASCULAR:  Negative for chest pain and palpitations.      RESPIRATORY:  Positive for dyspnea and frequent wheezing.          [facility]/SH:     Last Reviewed on 4/02/2019 04:12 PM by Mariama Gonzales    Past Medical History:                 PAST MEDICAL HISTORY         Coronary Artery Disease    Hypertension    Myocardial Infarction: s/p stenting x 3;     Type 2 Diabetes: uncontrolled;     Hypothyroidism         PREVENTIVE HEALTH MAINTENANCE             EYE EXAM: Diabetic Eye Exam during this calendar year and results are in chart was last done 2/5/2019         Surgical History:         Appendectomy: at age at very youg age;     Cholecystectomy: open procedure; at age 36; uncomplicated;     Coronary Artery Stent Placement: at age 56;     Hysterectomy: at age 36; total;      hemorrhoids surgery 2005; Procedures: colonoscopy 2005         Family History:     Father: Prostate Cancer ( death at age  59 )     Mother: Myocardial Infarction ( at age 64 )         Social History:     Occupation: Retired (Prior occupation: factory work)     Marital Status:  lives at [place]     Children: 2 children         Tobacco/Alcohol/Supplements:     Last Reviewed on 4/02/2019 04:12 PM by Mariama Gonzales    Tobacco: She has a past history of cigarette smoking; quit date:  1997.          Alcohol: Frequency:    rarely;         Substance Abuse History:     Last Reviewed on 4/02/2019 04:12 PM by Mariama Gonzales    None         Mental Health History:     Last Reviewed on 4/02/2019 04:12 PM by Mariama Gonzales        Communicable Diseases (eg STDs):     Last Reviewed on 4/02/2019 04:12 PM by Mariama Gonzales            Current Problems:     Last Reviewed on 4/02/2019 04:12 PM by Mariama Gonzales    Use of high risk medications     Depression, recurrent     CAD     Morbid obesity     Microalbuminuria     Foot pain     Diabetes with neurological manifestations, type II or unspecified type, not stated as uncontrolled     Acquired hypothyroidism     Hypercholesterolemia     Hypertension     Insomnia     Screening mammogram - other     Dysuria         Immunizations:     Fluzone High-Dose pf (>=65 yr) 11/1/2018         Allergies:     Last Reviewed on 5/06/2019 01:01 PM by Kailyn Garnica    Actos:    Lovastatin:        Current Medications:     Last Reviewed on 5/06/2019 01:01 PM by Kailyn Garnica    Synthroid 0.075mg Tablet Take 1 tablet(s) by mouth daily     Nitroglycerin 0.4mg Tablets, Sublingual Dissolve 1 tablet(s) under the tongue may repeat every 5 minutes. Maximum of 3 doses in 15 minutes     Gabapentin 300mg Capsules 1 capsule po BID     Quetiapine Fumarate 25mg Tablet 1 qhs     Isosorbide Dinitrate 30mg Tablet one a day     Hydrochlorothiazide (HCTZ) 25mg Tablet 1 TAB DAILY     Metoprolol 50mg Tablet 1 TAB BID     Aspirin (ASA) 81mg Chewable Tablet     b12 2500 mg     Basaglar KwikPen 100units/1ml Injection 20 units once  a day     Bumetanide 1mg Tablet 1 tab twice daily as needed for fluid     Humalog KwikPen 100units/1ml Pen System, Disposable 30 units qhs         OBJECTIVE:        Vitals:         Current: 5/6/2019 1:03:30 PM    Ht:  4 ft, 11.5 in;  Wt: 188.4 lbs;  BMI: 37.4    T: 98 F (oral);  BP: 174/45 mm Hg (left arm, sitting);  P: 49 bpm (left arm (BP Cuff), sitting);  sCr: 1.53 mg/dL;  GFR: 30.75    O2 Sat: 96 % (room air)        Repeat:     1:05:33 PM     BP:   122/32mm Hg (right arm, sitting)         Exams:     PHYSICAL EXAM:     GENERAL: well developed, well nourished;  no apparent distress;     EYES: PERRL, EOMI     E/N/T: EARS: external auditory canal normal;  bilateral TMs are normal;  NOSE: normal turbinates; no sinus tenderness; OROPHARYNX: oral mucosa is normal; posterior pharynx shows no exudate and post nasal drip;     NECK: range of motion is normal; trachea is midline;     RESPIRATORY: normal appearance and symmetric expansion of chest wall; normal respiratory rate and pattern with no distress; normal breath sounds with no rales, rhonchi, wheezes or rubs;     CARDIOVASCULAR: normal rate; rhythm is regular;     NEUROLOGIC: mental status: alert and oriented x 3;     PSYCHIATRIC: appropriate affect and demeanor;         ASSESSMENT           491.21   J44.1  Chronic bronchitis, obstructive, with (acute) exacerbation              DDx:     250.60   E11.40  Diabetes with neurological manifestations, type II or unspecified type, not stated as uncontrolled              DDx:         ORDERS:         Meds Prescribed:       ProAir HFA (Albuterol) 90mcg/1actuation Oral Inhaler Inhale 2 puff(s) by mouth q 4 to 6 hr prn.  #16 (Sixteen) gm Refills: 1       Prednisone 10mg Tablet 3 qd x 3 days, then 2 qd x 3 dasy, then 1 qd x 3 days.  #18 (Eighteen) tablet(s) Refills: 0                 PLAN:          Chronic bronchitis, obstructive, with (acute) exacerbation No acute concerning findings on exam today. Consider chest xray if no  improvement in symptoms.         RECOMMENDATIONS given include: Push Fluids, Rest, Follow up if no improvement or worsening symptoms like high fevers, vomiting, weakness, or increasing shortness of air.    .      FOLLOW-UP: Schedule follow-up appointments on a p.r.n. basis. Patient already scheduled for follow-up appointment with PCP           Prescriptions:       ProAir HFA (Albuterol) 90mcg/1actuation Oral Inhaler Inhale 2 puff(s) by mouth q 4 to 6 hr prn.  #16 (Sixteen) gm Refills: 1       Prednisone 10mg Tablet 3 qd x 3 days, then 2 qd x 3 dasy, then 1 qd x 3 days.  #18 (Eighteen) tablet(s) Refills: 0          Diabetes with neurological manifestations, type II or unspecified type, not stated as uncontrolled Monitor blood sugar closely during times of illness and when taking oral steroids. Call for any concerns.             Patient Recommendations:        For  Chronic bronchitis, obstructive, with (acute) exacerbation:     Schedule follow-up appointments as needed.              CHARGE CAPTURE           **Please note: ICD descriptions below are intended for billing purposes only and may not represent clinical diagnoses**        Primary Diagnosis:         491.21 Chronic bronchitis, obstructive, with (acute) exacerbation            J44.1    Chronic obstructive pulmonary disease with (acute) exacerbation              Orders:          36308   Office/outpatient visit; established patient, level 3  (In-House)           250.60 Diabetes with neurological manifestations, type II or unspecified type, not stated as uncontrolled            E11.40    Type 2 diabetes mellitus with diabetic neuropathy, unspecified

## 2021-07-01 VITALS
TEMPERATURE: 97.8 F | SYSTOLIC BLOOD PRESSURE: 138 MMHG | BODY MASS INDEX: 40.44 KG/M2 | DIASTOLIC BLOOD PRESSURE: 68 MMHG | HEIGHT: 60 IN | WEIGHT: 206 LBS

## 2021-07-01 VITALS
SYSTOLIC BLOOD PRESSURE: 122 MMHG | HEART RATE: 49 BPM | DIASTOLIC BLOOD PRESSURE: 32 MMHG | HEIGHT: 60 IN | BODY MASS INDEX: 36.99 KG/M2 | WEIGHT: 188.4 LBS | TEMPERATURE: 98 F | OXYGEN SATURATION: 96 %

## 2021-07-01 VITALS
DIASTOLIC BLOOD PRESSURE: 55 MMHG | SYSTOLIC BLOOD PRESSURE: 188 MMHG | BODY MASS INDEX: 35.65 KG/M2 | WEIGHT: 181.6 LBS | HEART RATE: 53 BPM | TEMPERATURE: 97.6 F | HEIGHT: 60 IN

## 2021-07-01 VITALS
WEIGHT: 191.8 LBS | BODY MASS INDEX: 37.66 KG/M2 | DIASTOLIC BLOOD PRESSURE: 49 MMHG | HEIGHT: 60 IN | HEART RATE: 56 BPM | TEMPERATURE: 97.2 F | SYSTOLIC BLOOD PRESSURE: 197 MMHG

## 2021-07-01 VITALS
HEART RATE: 48 BPM | SYSTOLIC BLOOD PRESSURE: 160 MMHG | WEIGHT: 193 LBS | HEIGHT: 60 IN | TEMPERATURE: 97.3 F | BODY MASS INDEX: 37.89 KG/M2 | DIASTOLIC BLOOD PRESSURE: 68 MMHG